# Patient Record
Sex: MALE
[De-identification: names, ages, dates, MRNs, and addresses within clinical notes are randomized per-mention and may not be internally consistent; named-entity substitution may affect disease eponyms.]

---

## 2020-11-27 ENCOUNTER — TRANSCRIPTION ENCOUNTER (OUTPATIENT)
Age: 65
End: 2020-11-27

## 2021-02-01 ENCOUNTER — TRANSCRIPTION ENCOUNTER (OUTPATIENT)
Age: 66
End: 2021-02-01

## 2021-02-04 ENCOUNTER — TRANSCRIPTION ENCOUNTER (OUTPATIENT)
Age: 66
End: 2021-02-04

## 2021-05-18 ENCOUNTER — TRANSCRIPTION ENCOUNTER (OUTPATIENT)
Age: 66
End: 2021-05-18

## 2021-06-09 ENCOUNTER — TRANSCRIPTION ENCOUNTER (OUTPATIENT)
Age: 66
End: 2021-06-09

## 2021-06-21 ENCOUNTER — TRANSCRIPTION ENCOUNTER (OUTPATIENT)
Age: 66
End: 2021-06-21

## 2021-08-24 ENCOUNTER — APPOINTMENT (OUTPATIENT)
Dept: BARIATRICS | Facility: CLINIC | Age: 66
End: 2021-08-24

## 2021-09-09 ENCOUNTER — APPOINTMENT (OUTPATIENT)
Dept: BARIATRICS | Facility: CLINIC | Age: 66
End: 2021-09-09
Payer: COMMERCIAL

## 2021-09-09 DIAGNOSIS — Z00.00 ENCOUNTER FOR GENERAL ADULT MEDICAL EXAMINATION W/OUT ABNORMAL FINDINGS: ICD-10-CM

## 2021-09-09 DIAGNOSIS — E66.01 MORBID (SEVERE) OBESITY DUE TO EXCESS CALORIES: ICD-10-CM

## 2021-09-09 PROCEDURE — 99204 OFFICE O/P NEW MOD 45 MIN: CPT | Mod: 95

## 2021-09-09 NOTE — HISTORY OF PRESENT ILLNESS
[de-identified] : Patient is a 67 yo M with a BMI of 45.6. He has a h/o AFib on Xarelto, possible EMELIA. Patient has no history of regular GERD or HB. \par \par We discussed at length surgical and non-surgical options and that non surgical approaches are unlikely to lead to long term, sustained weight loss. We also discussed that surgery alone is unlikely to be successful but should rather be seen as a tool for weight loss to be integrated with physical activity and nutritional counseling. The patient verbalized understanding and agrees to proceed with the evaluation. All risks of surgery were explained to the patient including the risks of leaks, infections, blood clots and death.\par

## 2021-09-09 NOTE — ASSESSMENT
[FreeTextEntry1] : Patient is a 65 yo M with a BMI of 45.6. He has a h/o AFib on Xarelto, possible EMELIA. Patient has no history of regular GERD or HB. \par \par We discussed at length surgical and non-surgical options and that non surgical approaches are unlikely to lead to long term, sustained weight loss. We also discussed that surgery alone is unlikely to be successful but should rather be seen as a tool for weight loss to be integrated with physical activity and nutritional counseling. The patient verbalized understanding and agrees to proceed with the evaluation. All risks of surgery were explained to the patient including the risks of leaks, infections, blood clots and death.

## 2021-09-15 PROBLEM — Z00.00 ENCOUNTER FOR PREVENTIVE HEALTH EXAMINATION: Status: ACTIVE | Noted: 2021-08-02

## 2021-09-15 PROBLEM — E66.01 MORBID OBESITY: Status: ACTIVE | Noted: 2021-09-15
